# Patient Record
Sex: MALE | Race: WHITE
[De-identification: names, ages, dates, MRNs, and addresses within clinical notes are randomized per-mention and may not be internally consistent; named-entity substitution may affect disease eponyms.]

---

## 2018-03-17 ENCOUNTER — HOSPITAL ENCOUNTER (EMERGENCY)
Dept: HOSPITAL 25 - UCEAST | Age: 53
Discharge: HOME | End: 2018-03-17
Payer: COMMERCIAL

## 2018-03-17 VITALS — SYSTOLIC BLOOD PRESSURE: 140 MMHG | DIASTOLIC BLOOD PRESSURE: 87 MMHG
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
## 2018-03-17 DIAGNOSIS — S30.0XXA: Primary | ICD-10-CM

## 2018-03-17 DIAGNOSIS — M62.830: ICD-10-CM
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
## 2018-03-17 PROCEDURE — 96372 THER/PROPH/DIAG INJ SC/IM: CPT

## 2018-03-17 PROCEDURE — G0463 HOSPITAL OUTPT CLINIC VISIT: HCPCS

## 2018-03-17 PROCEDURE — 99212 OFFICE O/P EST SF 10 MIN: CPT

## 2018-03-17 PROCEDURE — 81003 URINALYSIS AUTO W/O SCOPE: CPT

## 2018-03-17 PROCEDURE — 72110 X-RAY EXAM L-2 SPINE 4/>VWS: CPT

## 2018-03-17 NOTE — RAD
INDICATION:  Left lower back pain x5 days since a fall



COMPARISON: None.



TECHNIQUE: 5 views of the lumbar spine were obtained.



FINDINGS: The vertebra are in normal alignment. No fracture is seen. Multilevel

degenerative disc disease includes loss of disc height at the lower thoracic and lumbar

spine most severely affecting L4/L5 where there is sclerotic change and marginal

osteophyte formation. There is no severe spondylolisthesis or appearance of acute

compression fracture.



IMPRESSION: 

 Degenerative changes of the lumbar spine without radiographically apparent acute

abnormality.

## 2018-03-17 NOTE — UC
Back Pain HPI





- HPI Summary


HPI Summary: 





Pt presents with left sided low back pain since 3/14. He tells me that he was 

in the woods and tripped - fell backwards and his back landed on a tree stump. 

Had immediate pain, but subsided within the hour. Did not have much pain the 

day after, but noticed the area was swollen. Yesterday he developed pain and 

noticed spasms into his left buttocks and leg. He has been taking ibuprofen 

with mild relief. Denies numbness, tingling, saddle anesthesia, dysuria, 

hematuria, or loss of bowel/bladder control.








- History of Current Complaint


Chief Complaint: UCBackPain


Stated Complaint: BACK INJURY


Time Seen by Provider: 03/17/18 13:23


Hx Obtained From: Patient


Onset/Duration: Sudden Onset


Timing: Constant


Severity Initially: Moderate


Severity Currently: Moderate


Pain Intensity: 7


Pain Scale Used: 0-10 Numeric


Aggravating Factor(s): Movement, Lifting


Alleviating Factor(s): Rest, Position





- Allergies/Home Medications


Allergies/Adverse Reactions: 


 Allergies











Allergy/AdvReac Type Severity Reaction Status Date / Time


 


No Known Allergies Allergy   Verified 03/17/18 13:21











Home Medications: 


 Home Medications





Fexofenadine/Pseudoephedrine [Allegra-D 24 Hour Tablet] 1 tab PO DAILY 03/17/18 

[History Confirmed 03/17/18]











PMH/Surg Hx/FS Hx/Imm Hx


Previously Healthy: Yes





- Surgical History


Surgical History: None





- Family History


Known Family History: Positive: None





- Social History


Occupation: Employed Full-time


Lives: With Family


Alcohol Use: Daily


Alcohol Amount: one drink daily


Substance Use Type: None


Smoking Status (MU): Never Smoked Tobacco





Review of Systems


Constitutional: Negative


Skin: Negative


Respiratory: Negative


Cardiovascular: Negative


Gastrointestinal: Negative


Genitourinary: Negative


Neurovascular: Negative


Musculoskeletal: Other: - Pain low back


Neurological: Negative


Psychological: Negative


All Other Systems Reviewed And Are Negative: Yes





Physical Exam





- Summary


Physical Exam Summary: 





GENERAL: NAD. WDWN. No pain distress.


SKIN: No rashes, sores, ulcers, masses, lesions. 


NECK: Supple. FROM. Nontender. No lymphadenopathy. 


CHEST:  CTAB. No r/r/w. No accessory muscle use. Breathing comfortably and in 

no distress.


CV:  RRR. Without m/r/g. Pulses intact. Brisk cap refill.


MSK: TTP over left lumbar paraspinal muscles. No pain with flexion and 

extension of spine. Positive SLR on left. Strength 5/5 B/L LEs including 

dorsiflexion and plantar flexion. FROM B/L LEs. No edema. No CVA tenderness


NEURO: Alert. CN II-XII grossly intact. Sensations intact B/L LEs L3-S1.


PSYCH: Age appropriate behavior.


Triage Information Reviewed: Yes


Vital Signs: 


 Initial Vital Signs











Temp  98.3 F   03/17/18 13:17


 


Pulse  68   03/17/18 13:17


 


Resp  18   03/17/18 13:17


 


BP  140/87   03/17/18 13:17


 


Pulse Ox  100   03/17/18 13:17














Back Pain Course/Dx





- Course


Course Of Treatment: XR: IMPRESSION:  Degenerative changes of the lumbar spine 

without radiographically apparent acute abnormality.  UA trace glucose and 1+ 

bilirubin.  Suspect muscle contusion. Advised to continue with ibuprofen. Will 

add flexeril.





- Differential Dx/Diagnosis


Provider Diagnoses: Lumbar muscle spasm.  Contusion





Discharge





- Discharge Plan


Condition: Stable


Disposition: HOME


Prescriptions: 


Cyclobenzaprine TAB* [Flexeril 10 MG TAB*] 10 mg PO BID PRN #10 tab


 PRN Reason: Pain


Patient Education Materials:  Contusion in Adults (ED), Muscle Spasm (ED)


Referrals: 


Samuel Renteria MD [Primary Care Provider] - 


Additional Instructions: 


If you develop a fever, shortness of breath, chest pain, new or worsening 

symptoms - please call your PCP or go to the ED.


 





Your blood pressure was high at todays visit. Please see your primary provider 

within 4 weeks for recheck and re-evaluation.

## 2018-09-04 ENCOUNTER — HOSPITAL ENCOUNTER (EMERGENCY)
Dept: HOSPITAL 25 - ED | Age: 53
Discharge: ANOTHER HOSPITAL | End: 2018-09-04
Payer: COMMERCIAL

## 2018-09-04 VITALS — SYSTOLIC BLOOD PRESSURE: 179 MMHG | DIASTOLIC BLOOD PRESSURE: 101 MMHG

## 2018-09-04 DIAGNOSIS — I60.9: Primary | ICD-10-CM

## 2018-09-04 DIAGNOSIS — R51: ICD-10-CM

## 2018-09-04 LAB
BASOPHILS # BLD AUTO: 0.1 10^3/UL (ref 0–0.2)
EOSINOPHIL # BLD AUTO: 1.9 10^3/UL (ref 0–0.6)
HCT VFR BLD AUTO: 41 % (ref 42–52)
HGB BLD-MCNC: 14.3 G/DL (ref 14–18)
INR PPP/BLD: 0.9 (ref 0.77–1.02)
LYMPHOCYTES # BLD AUTO: 1.6 10^3/UL (ref 1–4.8)
MCH RBC QN AUTO: 32 PG (ref 27–31)
MCHC RBC AUTO-ENTMCNC: 35 G/DL (ref 31–36)
MCV RBC AUTO: 90 FL (ref 80–94)
MONOCYTES # BLD AUTO: 0.5 10^3/UL (ref 0–0.8)
NEUTROPHILS # BLD AUTO: 6.9 10^3/UL (ref 1.5–7.7)
NRBC # BLD AUTO: 0 10^3/UL
NRBC BLD QL AUTO: 0.1
PLATELET # BLD AUTO: 288 10^3/UL (ref 150–450)
RBC # BLD AUTO: 4.52 10^6/UL (ref 4–5.4)
WBC # BLD AUTO: 11.1 10^3/UL (ref 3.5–10.8)

## 2018-09-04 PROCEDURE — 86901 BLOOD TYPING SEROLOGIC RH(D): CPT

## 2018-09-04 PROCEDURE — 83605 ASSAY OF LACTIC ACID: CPT

## 2018-09-04 PROCEDURE — 85610 PROTHROMBIN TIME: CPT

## 2018-09-04 PROCEDURE — 86900 BLOOD TYPING SEROLOGIC ABO: CPT

## 2018-09-04 PROCEDURE — 80053 COMPREHEN METABOLIC PANEL: CPT

## 2018-09-04 PROCEDURE — 85025 COMPLETE CBC W/AUTO DIFF WBC: CPT

## 2018-09-04 PROCEDURE — 70450 CT HEAD/BRAIN W/O DYE: CPT

## 2018-09-04 PROCEDURE — 71045 X-RAY EXAM CHEST 1 VIEW: CPT

## 2018-09-04 PROCEDURE — 86850 RBC ANTIBODY SCREEN: CPT

## 2018-09-04 PROCEDURE — 85730 THROMBOPLASTIN TIME PARTIAL: CPT

## 2018-09-04 PROCEDURE — 93005 ELECTROCARDIOGRAM TRACING: CPT

## 2018-09-04 PROCEDURE — 99285 EMERGENCY DEPT VISIT HI MDM: CPT

## 2018-09-04 PROCEDURE — 36415 COLL VENOUS BLD VENIPUNCTURE: CPT

## 2018-09-04 PROCEDURE — 84484 ASSAY OF TROPONIN QUANT: CPT

## 2018-09-04 PROCEDURE — 80061 LIPID PANEL: CPT

## 2018-09-04 PROCEDURE — 96374 THER/PROPH/DIAG INJ IV PUSH: CPT

## 2018-09-04 PROCEDURE — 96375 TX/PRO/DX INJ NEW DRUG ADDON: CPT

## 2018-09-04 NOTE — RAD
INDICATION: Headache



COMPARISON: None.

 

TECHNIQUE: Single AP portable view of the chest was obtained.



FINDINGS: 



Image quality is compromised due to the relative inferiority of a portable chest x-ray.



The heart and mediastinum exhibit normal size and contour.



The lungs are grossly clear. There is no evidence of a large pleural effusion.



Visualized bones are normal for the patient's age.



IMPRESSION:  No radiographic evidence for acute cardiopulmonary abnormality on this

portable chest x-ray.

## 2018-09-04 NOTE — RAD
INDICATION:  Headache and dizziness



COMPARISON: None.



TECHNIQUE: Contiguous axial sections of the brain were obtained from the skull base to the

vertex without contrast. Sagittal and coronal reformats were created and reviewed.



FINDINGS: 



The ventricles, cisterns and sulci are within normal limits. There is questionable

hyperdense material at the suprasellar cistern (axial image 10 of 32) with extension

towards the right sylvian cistern.



At the right thalamus (image 11) there is a 1.7 cm hypoattenuating focus asymmetric from

the contralateral side. Otherwise the gray-white matter differentiation is adequately

maintained and there is no sulcal effacement. No significant focal abnormality or mass

effect is present. 



There is no evidence for intracranial hemorrhage.



No significant focal osseous abnormality is present. 



There is mild mucosal thickening of the anterior ethmoid air cells and right frontal sinus

where there are dependent frothy secretions. Remaining visualized paranasal sinuses are

adequately aerated.



The mastoid air cells are well aerated bilaterally.



IMPRESSION:  

1. Questionable amorphous dense material in the suprasellar cistern extending towards the

right sylvian fissure is concerning for subarachnoid hemorrhage.

2. Asymmetric 1.7 cm hypoattenuating focus in the left thalamus could be an age

indeterminant infarction or parenchymal mass.

3. Mild to moderate paranasal sinus mucosal disease.



Superior characterization could be made with MRI/MRA or CTA.



The possible extra-axial hemorrhage in the suprasellar cistern was discussed over the

telephone with Dr. Ni at approximately 1755 hours and the possible left thalamus focal

infarct was discussed at 1807 hours on September 04, 2018.

## 2018-09-04 NOTE — ED
Headache





- HPI Summary


HPI Summary: 


This patient is a 52 year old M presenting to [facility] accompanied by his wife 

with a chief complaint of 10/10 HA since 1715. 1743 code grey called. He notes 

it was sudden onset, occipital region bilaterally, and describes the pain as a 

burning, throbbing pressure that has radiated down his neck causing decreased 

ROM. Pt endorses dizziness.





- History Of Current Complaint


Chief Complaint: EDHeadache


Stated Complaint: NECK PAIN/DIZZY


Time Seen by Provider: 09/04/18 17:38


Hx Obtained From: Patient


Onset/Duration: Sudden Onset, Started minutes ago, Still Present


Initially Headache Was: "Worst Headache Ever", Initial Pain Scale(0-10)= - 10


Currently Pain Is: Current Pain Scale(0-10)= - 10


Timing: Constant


Character: Throbbing, Pressure


Location of Headache: Occipital


Radiates to: neck


Aggravating Factor: Nothing


Allevating Factors: Nothing


Associated Signs And Symptoms: Dizziness, Neck Pain, Neck Stiffness





- Allergies/Home Medications


Allergies/Adverse Reactions: 


 Allergies











Allergy/AdvReac Type Severity Reaction Status Date / Time


 


No Known Allergies Allergy   Verified 03/17/18 13:21














PMH/Surg Hx/FS Hx/Imm Hx


Endocrine/Hematology History: 


   Denies: Hx Sickle Cell Disease


Cardiovascular History: 


   Denies: Hx Pacemaker/ICD


Respiratory History: 


   Denies: Hx Lung Cancer


GI History: 


   Denies: Hx Ileostomy


 History: 


   Denies: Hx Dialysis


Musculoskeletal History: 


   Denies: Hx Rheumatoid Arthritis, Hx Osteoporosis


Sensory History: 


   Denies: Hx Legally Blind, Hx Deafness


Opthamlomology History: 


   Denies: Hx Legally Blind


EENT History: 


   Denies: Hx Deafness


Neurological History: 


   Denies: Hx Developmental Delay


Psychiatric History: 


   Denies: Hx Autism, Hx Schizophrenia


Infectious Disease History: No


Infectious Disease History: 


   Denies: Traveled Outside the [place] in Last 30 Days





- Family History


Known Family History: 


   Negative: Other - CVA, aneurysm, brain bleeds





- Social History


Occupation: Employed Full-time


Lives: With Family


Alcohol Use: Daily


Alcohol Amount: one drink daily


Hx Substance Use: No


Substance Use Type: Reports: None


Smoking Status (MU): Never Smoked Tobacco





Review of Systems


Negative: Fever


Positive: no symptoms reported


Positive: Arthralgia, Myalgia - neck, Decreased ROM - neck


Positive: Headache - worst headache ever


All Other Systems Reviewed And Are Negative: Yes





Physical Exam





- Summary


Physical Exam Summary: 


Appearance: The patient is well-nourished in severe distress and in severe pain.


 


Skin: The skin is warm and dry and skin color reflects adequate perfusion.


 


HEENT: The head is normocephalic and atraumatic. The pupils are equal and 

reactive. The conjunctivae are clear and without drainage. Nares are patent and 

without drainage. Mouth reveals moist mucous membranes and the throat is 

without erythema and exudate. The external ears are intact. The ear canals are 

patent and without drainage. The tympanic membranes are intact.


 


Neck: The neck is supple with full range of motion and non-tender. There are no 

carotid bruits. There is no neck vein distension. No meningysmus.


 


Respiratory: Chest is non-tender. Lungs are clear to auscultation and breath 

sounds are symmetrical and equal.


 


Cardiovascular: Heart is regular rate and rhythm. There is no murmur or rub 

auscultated. There is no peripheral edema and pulses are symmetrical and equal.


 


Abdomen: The abdomen is soft and non-tender. There are normal bowel sounds 

heard in all four quadrants and there is no organomegaly palpated.


 


Musculoskeletal: There is no back tenderness noted. Extremities are non-tender 

with full range of motion. There is good capillary refill. There is no 

peripheral edema or calf tenderness elicited. No meningysmus.


 


Neurological: Patient is alert and oriented to person, place and time. The 

patient has symmetrical motor strength in all four extremities. Cranial nerves 

are grossly intact. Deep tendon reflexes are symmetrical and equal in all four 

extremities. NIH = 0.


 


Psychiatric: The patient has an appropriate affect and does not exhibit any 

anxiety or depression.


Triage Information Reviewed: Yes


Vital Signs On Initial Exam: 


 Initial Vitals











Temp Pulse Resp BP Pulse Ox


 


 98.4 F   83   18   153/107   95 


 


 09/04/18 17:25  09/04/18 17:25  09/04/18 17:25  09/04/18 17:25  09/04/18 17:25











Vital Signs Reviewed: Yes





- Jailyn Coma Scale


Best Eye Response: 4 - Spontaneous


Best Motor Response: 6 - Obeys Commands


Best Verbal Response: 5 - Oriented


Coma Scale Total: 15





Diagnostics





- Vital Signs


 Vital Signs











  Temp Pulse Resp BP Pulse Ox


 


 09/04/18 17:43  98.2 F  97  16  118/71  98


 


 09/04/18 17:25  98.4 F  83  18  153/107  95














- Laboratory


Result Diagrams: 


 09/04/18 17:57





 09/04/18 17:57


Lab Statement: Any lab studies that have been ordered have been reviewed, and 

results considered in the medical decision making process.





- Radiology


  ** CXR


Xray Interpretation: No Acute Changes


Radiology Interpretation Completed By: Radiologist - No radiographic evidence 

for acute cardiopulmonary abnormality on this portable chest x-ray.  Dr. Ni 

has reviewed this report.





- CT


  ** Brain


CT Interpretation: Positive (See Comments)


CT Interpretation Completed By: Radiologist - 1. Questionable amorphous dense 

material in the suprasellar cistern extending towards the right sylvian fissure 

is concerning for subarachnoid hemorrhage. 2. Asymmetric 1.7 cm hypoattenuating 

focus in the left thalamus could be an age indeterminant infarction or 

parenchymal mass. 3. Mild to moderate paranasal sinus mucosal disease.  

Superior characterization could be made with MRI/MRA or CTA.





- EKG


  ** 1757


Cardiac Rate: NL - 97, borderline tachycardia


EKG Rhythm: Sinus Rhythm


ST Segment: Normal


Ectopy: None


EKG Interpretation: No STEMI, borderline tachycardia.





Headache Course/Dx





- Course


Course Of Treatment: Mr. Poe presented with the sudden acute onset of 

occipital pain about 20 minutes prior to my evaluation.  He describes the pain 

as a 10 and is obviously in significant distress.  He denies visual changes or 

gait changes but was able to walk.  On exam his NIH stroke scale was 0 but he 

was in obvious distress.  He was tender to firm flexion of his neck but did not 

actually have meningeal signs.  A code gray was called immediately and he was 

sent for CT scan.  He was found to have a subarachnoid hemorrhage and Dr. Ames was contacted who recommended transfer emergently.  Dr. Rivas at [facility] accepted transfer and he will be transferred by helicopter.  On arrival 

his blood pressure was elevated at 150/107 which normalized to 120/70 after CT 

scan.  He was given Dilaudid twice for his severe headache which helped a bit.  

His blood pressure crept up again and he is now being given a dose of labetalol 

IV.  We will continue to manage his blood pressure to try to keep the systolic 

below 140.





- Diagnoses


Provider Diagnoses: 


 Subarachnoid hemorrhage





During the Visit The Following Alert/Code Occurred: Code Grey - 5690





- Physician Notifications


Discussed Care Of Patient With: Jonel Ames


Time Discussed With Above Provider: 18:09


Instructed by Provider To: Other - recommends transfer





- Critical Care Time


Critical Care Time: 30-74 min





Discharge





- Sign-Out/Discharge


Documenting (check all that apply): Patient Departure - transfer to neuro ICU 

at [facility] via helicopter.





- Discharge Plan


Condition: Critical


Disposition: TRANS HIGHER LVL OF CARE FAC


Referrals: 


Samuel Renteria MD [Primary Care Provider] - 





- Billing Disposition and Condition


Condition: CRITICAL


Disposition: Trans Higher Lvl of Care Fac





- Attestation Statements


Document Initiated by Scribe: Yes


Documenting Scribe: Zack Nath


Provider For Whom Scribe is Documenting (Include Credential): Dr. Judson Ni MD


Scribe Attestation: 


IZack, scribed for Dr. Judson Ni MD on 09/04/18 at 1841. 


Scribe Documentation Reviewed: Yes


Provider Attestation: 


The documentation as recorded by the scribeZack accurately 

reflects the service I personally performed and the decisions made by me, Dr. Judson Ni MD





Consult


Consult: 


1830 Dr. Rivera from [facility] neuro ICU, accepts transfer.

## 2019-12-06 NOTE — ED
Bite Injury/Animal





- HPI Summary


HPI Summary: 


53 year old male presents with cat bite on Tuesday.  [place] feral cat in his barn.  He states he tried to catch the cat 

and it ended up biting him and ran away.  He has been washing area with soap 

and water and peroxide.  His tetanus is up-to-date.  No fevers or spreading 

redness.  He states he felt with primary today and they told him to come here 

for rabies vaccine. 








- History of Current Complaint


Chief Complaint: UCBiteInjury


Stated Complaint: BIT BY CAT NEEDS RABIES VACCINE


Time Seen by Provider: 12/06/19 12:10


Pain Intensity: 0





- Allergies/Home Medications


Allergies/Adverse Reactions: 


 Allergies











Allergy/AdvReac Type Severity Reaction Status Date / Time


 


No Known Allergies Allergy   Verified 12/06/19 11:58











Home Medications: 


 Home Medications





Acetaminophen [Acetaminophen Extra Strength] 1,000 mg PO ONCE 12/06/19 [History 

Confirmed 12/06/19]











PMH/Surg Hx/FS Hx/Imm Hx


Endocrine/Hematology History: 


   Denies: Hx Diabetes, Hx Sickle Cell Disease


Cardiovascular History: 


   Denies: Hx Hypertension - on simvastatin, patient not sure why, Hx Pacemaker/

ICD


Respiratory History: 


   Denies: Hx Lung Cancer


GI History: 


   Denies: Hx Ileostomy


 History: 


   Denies: Hx Dialysis, Hx Renal Disease


Musculoskeletal History: 


   Denies: Hx Rheumatoid Arthritis, Hx Osteoporosis


Sensory History: 


   Denies: Hx Legally Blind, Hx Deafness, Hx Hearing Aid


Opthamlomology History: 


   Denies: Hx Legally Blind


Neurological History: 


   Denies: Hx Developmental Delay


Psychiatric History: 


   Denies: Hx Autism, Hx Panic Disorder, Hx Schizophrenia





- Cancer History


Cancer Type, Location and Year: SKIN





- Surgical History


Surgery Procedure, Year, and Place: LEFT KNEE 1985 [place].  ACL RECONSTRUCTION 

RIGHT KNEE [facility] 2013.  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


Infectious Disease History: No


Infectious Disease History: 


   Denies: Traveled Outside the [place] in Last 30 Days





- Family History


Known Family History: 


   Negative: Other - CVA, aneurysm, brain bleeds





- Social History


Alcohol Use: Daily


Alcohol Amount: one drink daily


Hx Substance Use: No


Substance Use Type: Reports: None


Smoking Status (MU): Never Smoked Tobacco





Review of Systems


Negative: Fever


Negative: Chest Pain


Negative: Shortness Of Breath


Positive: Other - cat bite right hand


All Other Systems Reviewed And Are Negative: Yes





Physical Exam


Triage Information Reviewed: Yes


Vital Signs On Initial Exam: 


 Initial Vitals











Temp Pulse Resp BP Pulse Ox


 


 99 F   70   16   127/84   99 


 


 12/06/19 11:59  12/06/19 11:59  12/06/19 11:59  12/06/19 11:59  12/06/19 11:59











Vital Signs Reviewed: Yes


Appearance: Positive: Well-Appearing


Skin: Positive: Warm, Dry, Other - puncture wound right hand with no evidence 

of cellulitis or dehiscence


Head/Face: Positive: Normal Head/Face Inspection


Eyes: Positive: Normal, Conjunctiva Clear


ENT: Positive: Pharynx normal


Respiratory/Lung Sounds: Positive: Clear to Auscultation, Breath Sounds Present


Cardiovascular: Positive: Normal, RRR


Musculoskeletal: Positive: Normal


Neurological: Positive: Normal


Psychiatric: Positive: Normal





Diagnostics





- Vital Signs


 Vital Signs











  Temp Pulse Resp BP Pulse Ox


 


 12/06/19 11:59  99 F  70  16  127/84  99














- Laboratory


Lab Statement: Any lab studies that have been ordered have been reviewed, and 

results considered in the medical decision making process.





Bite Injury Course/Dx





- Course


Course Of Treatment: 53 year old male presents with cat bite on Tuesday.  [place] 

[place]eral cat in his barn.  He states he 

tried to catch the cat and it ended up biting him and ran away.  He has been 

washing area with soap and water and peroxide.  His tetanus is up-to-date.  No 

fevers or spreading redness.  He states he felt with primary today and they 

told him to come here for rabies vaccine.  On exam has 2 puncture wounds 

presents for him.  No evidence of cellulitis. [facility] said to give HRIG 

and vaccine and follow up with [facility] for rest of treatment. patient 

understand and agrees with plan.





- Diagnoses


Provider Diagnosis: 


 Cat bite, Rabies, need for prophylactic vaccination against








Discharge ED





- Sign-Out/Discharge


Documenting (check all that apply): Patient Departure


All imaging exams completed and their final reports reviewed: No Studies





- Discharge Plan


Condition: Good


Disposition: HOME


Patient Education Materials:  Rabies Immune Globulin (By injection), Rabies 

Vaccine (ED)


Referrals: 


Alistair Morel MD [Primary Care Provider] - 


Additional Instructions: 


follow up with [facility] for vaccines at day 3, 7, and 14


continue to keep puncture wound clean by washing with soap and water


Return to UC if develop any new or worsening symptoms





- Billing Disposition and Condition


Condition: GOOD


Disposition: Home